# Patient Record
Sex: MALE
[De-identification: names, ages, dates, MRNs, and addresses within clinical notes are randomized per-mention and may not be internally consistent; named-entity substitution may affect disease eponyms.]

---

## 2018-11-17 ENCOUNTER — RX RENEWAL (OUTPATIENT)
Age: 73
End: 2018-11-17

## 2018-11-17 PROBLEM — Z00.00 ENCOUNTER FOR PREVENTIVE HEALTH EXAMINATION: Status: ACTIVE | Noted: 2018-11-17

## 2018-11-18 ENCOUNTER — RX RENEWAL (OUTPATIENT)
Age: 73
End: 2018-11-18

## 2018-11-18 RX ORDER — METFORMIN ER 500 MG 500 MG/1
500 TABLET ORAL
Qty: 360 | Refills: 3 | Status: ACTIVE | COMMUNITY
Start: 2018-11-18 | End: 1900-01-01

## 2024-03-12 ENCOUNTER — NEW PATIENT (OUTPATIENT)
Dept: URBAN - METROPOLITAN AREA CLINIC 26 | Facility: CLINIC | Age: 79
End: 2024-03-12

## 2024-03-12 VITALS
WEIGHT: 228 LBS | HEART RATE: 70 BPM | HEIGHT: 66 IN | BODY MASS INDEX: 36.64 KG/M2 | SYSTOLIC BLOOD PRESSURE: 185 MMHG | DIASTOLIC BLOOD PRESSURE: 82 MMHG

## 2024-03-12 DIAGNOSIS — E10.3313: ICD-10-CM

## 2024-03-12 DIAGNOSIS — H02.834: ICD-10-CM

## 2024-03-12 DIAGNOSIS — H04.123: ICD-10-CM

## 2024-03-12 DIAGNOSIS — H02.831: ICD-10-CM

## 2024-03-12 DIAGNOSIS — H25.13: ICD-10-CM

## 2024-03-12 PROCEDURE — 92250 FUNDUS PHOTOGRAPHY W/I&R: CPT

## 2024-03-12 PROCEDURE — 92134 CPTRZ OPH DX IMG PST SGM RTA: CPT

## 2024-03-12 PROCEDURE — 99204 OFFICE O/P NEW MOD 45 MIN: CPT

## 2024-03-12 PROCEDURE — J2777S VABYSMO SAMPLE *

## 2024-03-12 PROCEDURE — 92235 FLUORESCEIN ANGRPH MLTIFRAME: CPT

## 2024-03-12 PROCEDURE — 67028 INJECTION EYE DRUG: CPT

## 2024-03-12 ASSESSMENT — TONOMETRY
OS_IOP_MMHG: 15
OD_IOP_MMHG: 13

## 2024-03-12 ASSESSMENT — VISUAL ACUITY
OS_SC: 20/20-2
OD_PH: 20/20-2
OD_SC: 20/40+1

## 2024-04-09 ENCOUNTER — CLINIC PROCEDURE ONLY (OUTPATIENT)
Dept: URBAN - METROPOLITAN AREA CLINIC 26 | Facility: CLINIC | Age: 79
End: 2024-04-09

## 2024-04-09 DIAGNOSIS — E10.3313: ICD-10-CM

## 2024-04-09 PROCEDURE — J2777S VABYSMO SAMPLE *

## 2024-04-09 PROCEDURE — 67028 INJECTION EYE DRUG: CPT

## 2024-04-09 PROCEDURE — 92134 CPTRZ OPH DX IMG PST SGM RTA: CPT

## 2024-04-09 ASSESSMENT — TONOMETRY
OS_IOP_MMHG: 18
OD_IOP_MMHG: 13

## 2024-04-16 ENCOUNTER — CLINIC PROCEDURE ONLY (OUTPATIENT)
Dept: URBAN - METROPOLITAN AREA CLINIC 26 | Facility: CLINIC | Age: 79
End: 2024-04-16

## 2024-04-16 DIAGNOSIS — E10.3313: ICD-10-CM

## 2024-04-16 PROCEDURE — 67028 INJECTION EYE DRUG: CPT

## 2024-04-16 PROCEDURE — 92250 FUNDUS PHOTOGRAPHY W/I&R: CPT

## 2024-04-16 PROCEDURE — J2777S VABYSMO SAMPLE *

## 2024-04-16 ASSESSMENT — TONOMETRY: OS_IOP_MMHG: 14

## 2024-06-05 ENCOUNTER — OFFICE VISIT (OUTPATIENT)
Dept: URBAN - METROPOLITAN AREA CLINIC 63 | Facility: CLINIC | Age: 79
End: 2024-06-05
Payer: MEDICARE

## 2024-06-05 ENCOUNTER — DASHBOARD ENCOUNTERS (OUTPATIENT)
Age: 79
End: 2024-06-05

## 2024-06-05 VITALS
SYSTOLIC BLOOD PRESSURE: 145 MMHG | HEIGHT: 67 IN | DIASTOLIC BLOOD PRESSURE: 76 MMHG | TEMPERATURE: 97.4 F | WEIGHT: 227.6 LBS | HEART RATE: 55 BPM | OXYGEN SATURATION: 95 % | BODY MASS INDEX: 35.72 KG/M2

## 2024-06-05 DIAGNOSIS — R19.7 DIARRHEA, UNSPECIFIED TYPE: ICD-10-CM

## 2024-06-05 DIAGNOSIS — Z85.038 PERSONAL HISTORY OF COLON CANCER: ICD-10-CM

## 2024-06-05 PROBLEM — 429699009: Status: ACTIVE | Noted: 2024-06-05

## 2024-06-05 PROCEDURE — 99203 OFFICE O/P NEW LOW 30 MIN: CPT

## 2024-06-05 RX ORDER — HYDRALAZINE HYDROCHLORIDE 25 MG/1
TABLET ORAL
Qty: 90 TABLET | Status: ACTIVE | COMMUNITY

## 2024-06-05 RX ORDER — TADALAFIL 5 MG/1
TAKE 1 TABLET BY MOUTH EVERY DAY TABLET, FILM COATED ORAL
Qty: 90 EACH | Refills: 0 | Status: ACTIVE | COMMUNITY

## 2024-06-05 RX ORDER — AMLODIPINE BESYLATE 10 MG/1
TABLET ORAL
Qty: 90 TABLET | Status: ACTIVE | COMMUNITY

## 2024-06-05 RX ORDER — LOSARTAN POTASSIUM 25 MG/1
TABLET, FILM COATED ORAL
Qty: 30 TABLET | Status: ACTIVE | COMMUNITY

## 2024-06-05 RX ORDER — METFORMIN HYDROCHLORIDE 1000 MG/1
TABLET, FILM COATED ORAL
Qty: 180 TABLET | Status: ACTIVE | COMMUNITY

## 2024-06-05 RX ORDER — TRAMADOL HYDROCHLORIDE 50 MG/1
TAKE 1 TABLET BY MOUTH EVERY 6 HOURS AS NEEDED-PRIOR TABLET, COATED ORAL
Qty: 30 EACH | Refills: 1 | Status: ACTIVE | COMMUNITY

## 2024-06-05 RX ORDER — LISINOPRIL 20 MG/1
TABLET ORAL
Qty: 90 TABLET | Status: ACTIVE | COMMUNITY

## 2024-06-05 RX ORDER — FOLIC ACID 1 MG/1
TAKE 1 TABLET BY MOUTH EVERY DAY TABLET ORAL
Qty: 90 EACH | Refills: 2 | Status: ACTIVE | COMMUNITY

## 2024-06-05 RX ORDER — CARVEDILOL 12.5 MG/1
TABLET, FILM COATED ORAL
Qty: 60 TABLET | Status: ACTIVE | COMMUNITY

## 2024-06-05 RX ORDER — ATORVASTATIN CALCIUM 20 MG/1
TAKE 1 TABLET BY MOUTH EVERY DAY TABLET, FILM COATED ORAL
Qty: 90 EACH | Refills: 3 | Status: ACTIVE | COMMUNITY

## 2024-06-05 RX ORDER — CLOPIDOGREL BISULFATE 75 MG/1
TABLET, FILM COATED ORAL
Qty: 90 TABLET | Status: ACTIVE | COMMUNITY

## 2024-06-05 RX ORDER — ALBUTEROL SULFATE 90 UG/1
AEROSOL, METERED RESPIRATORY (INHALATION)
Qty: 18 GRAM | Status: ACTIVE | COMMUNITY

## 2024-06-05 NOTE — HPI-TODAY'S VISIT:
Ronny is a 78-year-old male presenting to the office today for evaluation of diarrhea/Loose stools. He was admitted to the hospital 5/17/2024 - 5/21/2024 for COVID-19. He notes that shortly after being discharged from the hospital he noticed that his stools were looser than they were before the hospital. Now, he is having bowel movements every 2-3 days and they are always on the looser side and has had some watery stools as well. Before his hospital stay he was having bowel movements every other day or so. He also complains of what he describes as abdominal pressure that is relieved by a bowel movement. He has no other GI complaints, questions, concerns at this time. He does have a history of colon cancer and states that he was diagnosed with colon cancer in the 1990s and underwent surgical resection along with serial colonoscopies that have been negative since then. Per patient his most recent colonoscopy was 4 years ago in [place] and he was given a 5-year recall. We do not have these records. He denies melena, hematochezia, bright red blood per rectum, nausea/vomiting, hematemesis, reflux, abdominal pain, change in stool caliber, unintentional weight loss/weight gain, fever. . Hospitalized 5/17/2024 - 5/21/2024 for COVID-19 . 5/17/2024 labs; - CBC; RBC 4.09, hemoglobin 12.3, hematocrit 36.2, neutrophil percent 88.3, lymphocyte percent 4.4, absolute lymphocyte count 0.4, otherwise within normal limits - CMP; glucose 145, BUN 21, creatinine 2.05, anion gap 5, alkaline phosphatase 140, GFR 33, otherwise within normal limits

## 2024-07-23 ENCOUNTER — OFFICE VISIT (OUTPATIENT)
Dept: URBAN - METROPOLITAN AREA CLINIC 63 | Facility: CLINIC | Age: 79
End: 2024-07-23

## 2024-07-23 RX ORDER — ATORVASTATIN CALCIUM 20 MG/1
TAKE 1 TABLET BY MOUTH EVERY DAY TABLET, FILM COATED ORAL
Qty: 90 EACH | Refills: 3 | Status: ACTIVE | COMMUNITY

## 2024-07-23 RX ORDER — FOLIC ACID 1 MG/1
TAKE 1 TABLET BY MOUTH EVERY DAY TABLET ORAL
Qty: 90 EACH | Refills: 2 | Status: ACTIVE | COMMUNITY

## 2024-07-23 RX ORDER — AMLODIPINE BESYLATE 10 MG/1
TABLET ORAL
Qty: 90 TABLET | Status: ACTIVE | COMMUNITY

## 2024-07-23 RX ORDER — HYDRALAZINE HYDROCHLORIDE 25 MG/1
TABLET ORAL
Qty: 90 TABLET | Status: ACTIVE | COMMUNITY

## 2024-07-23 RX ORDER — LOSARTAN POTASSIUM 25 MG/1
TABLET, FILM COATED ORAL
Qty: 30 TABLET | Status: ACTIVE | COMMUNITY

## 2024-07-23 RX ORDER — ALBUTEROL SULFATE 90 UG/1
AEROSOL, METERED RESPIRATORY (INHALATION)
Qty: 18 GRAM | Status: ACTIVE | COMMUNITY

## 2024-07-23 RX ORDER — TRAMADOL HYDROCHLORIDE 50 MG/1
TAKE 1 TABLET BY MOUTH EVERY 6 HOURS AS NEEDED-PRIOR TABLET, COATED ORAL
Qty: 30 EACH | Refills: 1 | Status: ACTIVE | COMMUNITY

## 2024-07-23 RX ORDER — TADALAFIL 5 MG/1
TAKE 1 TABLET BY MOUTH EVERY DAY TABLET, FILM COATED ORAL
Qty: 90 EACH | Refills: 0 | Status: ACTIVE | COMMUNITY

## 2024-07-23 RX ORDER — CARVEDILOL 12.5 MG/1
TABLET, FILM COATED ORAL
Qty: 60 TABLET | Status: ACTIVE | COMMUNITY

## 2024-07-23 RX ORDER — LISINOPRIL 20 MG/1
TABLET ORAL
Qty: 90 TABLET | Status: ACTIVE | COMMUNITY

## 2024-07-23 RX ORDER — CLOPIDOGREL BISULFATE 75 MG/1
TABLET, FILM COATED ORAL
Qty: 90 TABLET | Status: ACTIVE | COMMUNITY

## 2024-07-23 RX ORDER — METFORMIN HYDROCHLORIDE 1000 MG/1
TABLET, FILM COATED ORAL
Qty: 180 TABLET | Status: ACTIVE | COMMUNITY

## 2024-07-23 NOTE — HPI-TODAY'S VISIT:
LOV 6/5/24 for diarrhea/lose stools, was in hospital 5/17-21 for covid and sxs started after d/c stool studies not done? benefiber? need cholestyramine? hx of colon ca in 90s with surgical resection in [place], colons have been neg ever since- records? per pt last colon 2020 with 5 yr recall . Hospitalized 5/17/2024 - 5/21/2024 for COVID-19 . 5/17/2024 labs; - CBC; RBC 4.09, hemoglobin 12.3, hematocrit 36.2, neutrophil percent 88.3, lymphocyte percent 4.4, absolute lymphocyte count 0.4, otherwise within normal limits - CMP; glucose 145, BUN 21, creatinine 2.05, anion gap 5, alkaline phosphatase 140, GFR 33, otherwise within normal limits